# Patient Record
Sex: FEMALE | Race: WHITE | NOT HISPANIC OR LATINO | URBAN - METROPOLITAN AREA
[De-identification: names, ages, dates, MRNs, and addresses within clinical notes are randomized per-mention and may not be internally consistent; named-entity substitution may affect disease eponyms.]

---

## 2019-02-08 ENCOUNTER — APPOINTMENT (RX ONLY)
Dept: URBAN - METROPOLITAN AREA CLINIC 12 | Facility: CLINIC | Age: 79
Setting detail: DERMATOLOGY
End: 2019-02-08

## 2019-02-08 DIAGNOSIS — Z41.1 ENCOUNTER FOR COSMETIC SURGERY: ICD-10-CM

## 2019-02-08 PROBLEM — I10 ESSENTIAL (PRIMARY) HYPERTENSION: Status: ACTIVE | Noted: 2019-02-08

## 2019-02-08 PROCEDURE — ? CONSULTATION - AGING FACE

## 2019-02-08 PROCEDURE — ? PATIENT SPECIFIC COUNSELING

## 2019-02-08 ASSESSMENT — LOCATION DETAILED DESCRIPTION DERM: LOCATION DETAILED: LEFT INFERIOR CENTRAL MALAR CHEEK

## 2019-02-08 ASSESSMENT — LOCATION SIMPLE DESCRIPTION DERM: LOCATION SIMPLE: LEFT CHEEK

## 2019-02-08 ASSESSMENT — LOCATION ZONE DERM: LOCATION ZONE: FACE

## 2019-02-08 NOTE — HPI: FACE (AGING FACE)
How Severe Is It?: moderate
Is This A New Presentation, Or A Follow-Up?: Aging Face
Additional History: Ella greenwood

## 2019-02-08 NOTE — PROCEDURE: PATIENT SPECIFIC COUNSELING
Other (Free Text): Patient presents face lift  consultation. Patient  states  she’s unhappy with her nasolabial folds and pockets around her jawline. Dr. Hawk examined patients face and informed her of the following. \\n\\nFillers: in her lines and wrinkles around her moth and folds \\n\\nLower face and neck lift: Dr. Hawk explained that the lower face and neck lift will improve her appearance and give her a refreshed look. Dr. Hawk explained that this procedure is the gold standard patient was made aware that she will have to stay overnight post surgery. \\n\\nDirect neck lift. Explained that this procedure will be in the office under local, this procedure will eliminate the excess skin, but will leave her with a straight line scar. \\n\\nPatient verbalized understanding. Coty stepped in to give a quote.
Detail Level: Zone